# Patient Record
Sex: FEMALE | Race: OTHER | Employment: PART TIME | URBAN - METROPOLITAN AREA
[De-identification: names, ages, dates, MRNs, and addresses within clinical notes are randomized per-mention and may not be internally consistent; named-entity substitution may affect disease eponyms.]

---

## 2019-02-06 ENCOUNTER — TELEPHONE (OUTPATIENT)
Dept: OBGYN CLINIC | Facility: CLINIC | Age: 24
End: 2019-02-06

## 2019-02-06 ENCOUNTER — OFFICE VISIT (OUTPATIENT)
Dept: OBGYN CLINIC | Facility: CLINIC | Age: 24
End: 2019-02-06
Payer: COMMERCIAL

## 2019-02-06 VITALS
HEART RATE: 77 BPM | SYSTOLIC BLOOD PRESSURE: 122 MMHG | DIASTOLIC BLOOD PRESSURE: 84 MMHG | WEIGHT: 184.25 LBS | HEIGHT: 67 IN | BODY MASS INDEX: 28.92 KG/M2

## 2019-02-06 DIAGNOSIS — Z32.00 ENCOUNTER FOR PREGNANCY TEST, RESULT UNKNOWN: ICD-10-CM

## 2019-02-06 DIAGNOSIS — N90.89 LABIAL LESION: Primary | ICD-10-CM

## 2019-02-06 DIAGNOSIS — Z11.3 SCREEN FOR STD (SEXUALLY TRANSMITTED DISEASE): ICD-10-CM

## 2019-02-06 PROBLEM — A60.00 RECURRENT GENITAL HSV (HERPES SIMPLEX VIRUS) INFECTION: Status: ACTIVE | Noted: 2019-02-06

## 2019-02-06 LAB
CONTROL LINE PRESENT WITH A CLEAR BACKGROUND (YES/NO): YES YES/NO
KIT LOT #: NORMAL NUMERIC
PREGNANCY TEST, URINE: NEGATIVE

## 2019-02-06 PROCEDURE — 81025 URINE PREGNANCY TEST: CPT | Performed by: ADVANCED PRACTICE MIDWIFE

## 2019-02-06 PROCEDURE — 99213 OFFICE O/P EST LOW 20 MIN: CPT | Performed by: ADVANCED PRACTICE MIDWIFE

## 2019-02-06 RX ORDER — ACYCLOVIR 800 MG/1
800 TABLET ORAL 2 TIMES DAILY
Qty: 10 TABLET | Refills: 0 | Status: SHIPPED | OUTPATIENT
Start: 2019-02-06 | End: 2019-02-11

## 2019-02-06 NOTE — TELEPHONE ENCOUNTER
PER PT CALLING TO CHECK THE STATUS OF HER SCRIPT / PT WANT TO KNOW WHICH PHARMACY IT WAS SEND TOO / PLS ADV

## 2019-02-06 NOTE — PROGRESS NOTES
HPI:    Patient ID: Bre Rahman is a 26year old female who presents c/o lesions on her vulva bilaterally that started 2 days ago. Reports they are mildly painful especially when urine touches them.  Last sexual activity was 1 month ago and reports using c Rx for acyclovir 800mg BID x 5 days and RTC in 1 week to reassess. If symptoms continue may consider treating with hydrocortisone. Vulvar hygiene reviewed and handout provided.   Condom use encouraged for prevention of STIs, pt encouraged to consider a mo

## 2019-02-07 LAB
C TRACH DNA SPEC QL NAA+PROBE: NEGATIVE
HSV1 DNA SPEC QL NAA+PROBE: POSITIVE
HSV2 DNA SPEC QL NAA+PROBE: NEGATIVE
N GONORRHOEA DNA SPEC QL NAA+PROBE: NEGATIVE

## 2019-02-12 ENCOUNTER — TELEPHONE (OUTPATIENT)
Dept: OBGYN CLINIC | Facility: CLINIC | Age: 24
End: 2019-02-12

## 2019-02-12 NOTE — TELEPHONE ENCOUNTER
----- Message from Harsha Robles CNM sent at 2/12/2019  6:28 AM CST -----  Culture positive for HSV type one.

## 2019-02-13 ENCOUNTER — OFFICE VISIT (OUTPATIENT)
Dept: OBGYN CLINIC | Facility: CLINIC | Age: 24
End: 2019-02-13
Payer: COMMERCIAL

## 2019-02-13 VITALS
BODY MASS INDEX: 29 KG/M2 | SYSTOLIC BLOOD PRESSURE: 135 MMHG | HEART RATE: 67 BPM | DIASTOLIC BLOOD PRESSURE: 87 MMHG | WEIGHT: 187 LBS

## 2019-02-13 DIAGNOSIS — B00.9 HSV-1 (HERPES SIMPLEX VIRUS 1) INFECTION: ICD-10-CM

## 2019-02-13 DIAGNOSIS — Z09 FOLLOW UP: Primary | ICD-10-CM

## 2019-02-13 PROCEDURE — 99213 OFFICE O/P EST LOW 20 MIN: CPT | Performed by: ADVANCED PRACTICE MIDWIFE

## 2019-02-13 RX ORDER — VALACYCLOVIR HYDROCHLORIDE 1 G/1
1 TABLET, FILM COATED ORAL DAILY
Qty: 5 TABLET | Refills: 0 | Status: SHIPPED | OUTPATIENT
Start: 2019-02-13 | End: 2019-02-18

## 2019-02-13 NOTE — PROGRESS NOTES
[facility] Focused Gynecology Problem Exam    Pedro Reyes is a 26year old female presenting for Gyn Exam (1wk f/u )  .     HPI:   Patient presents with:  Gyn Exam: 1wk f/u       Medications (Active prior to today's visit):    Current Outpa Position: Sitting, Cuff Size: adult)   Pulse 67   Wt 187 lb (84.8 kg)   LMP 01/16/2019   Breastfeeding?  No   BMI 29.29 kg/m²     GENERAL: well developed, well nourished, in no apparent distress  ABDOMEN: Soft, non distended; non tender, no masses  GYNE/:

## 2019-02-14 ENCOUNTER — TELEPHONE (OUTPATIENT)
Dept: OBGYN CLINIC | Facility: CLINIC | Age: 24
End: 2019-02-14

## 2019-05-01 ENCOUNTER — TELEPHONE (OUTPATIENT)
Dept: OBGYN CLINIC | Facility: CLINIC | Age: 24
End: 2019-05-01

## 2020-11-18 ENCOUNTER — HOSPITAL ENCOUNTER (OUTPATIENT)
Age: 25
Discharge: HOME | End: 2020-11-18
Attending: EMERGENCY MEDICINE
Payer: COMMERCIAL

## 2020-11-18 VITALS
BODY MASS INDEX: 29.82 KG/M2 | DIASTOLIC BLOOD PRESSURE: 89 MMHG | WEIGHT: 190 LBS | HEIGHT: 67 IN | SYSTOLIC BLOOD PRESSURE: 131 MMHG | TEMPERATURE: 98 F | RESPIRATION RATE: 16 BRPM | HEART RATE: 81 BPM | OXYGEN SATURATION: 100 %

## 2020-11-18 DIAGNOSIS — Z20.2 POSSIBLE EXPOSURE TO STD: Primary | ICD-10-CM

## 2020-11-18 PROCEDURE — 87086 URINE CULTURE/COLONY COUNT: CPT | Performed by: EMERGENCY MEDICINE

## 2020-11-18 PROCEDURE — 87591 N.GONORRHOEAE DNA AMP PROB: CPT | Performed by: EMERGENCY MEDICINE

## 2020-11-18 PROCEDURE — 87491 CHLMYD TRACH DNA AMP PROBE: CPT | Performed by: EMERGENCY MEDICINE

## 2020-11-18 PROCEDURE — 99202 OFFICE O/P NEW SF 15 MIN: CPT | Performed by: EMERGENCY MEDICINE

## 2020-11-18 PROCEDURE — 87205 SMEAR GRAM STAIN: CPT | Performed by: EMERGENCY MEDICINE

## 2020-11-18 PROCEDURE — 81002 URINALYSIS NONAUTO W/O SCOPE: CPT | Performed by: EMERGENCY MEDICINE

## 2020-11-18 PROCEDURE — 87808 TRICHOMONAS ASSAY W/OPTIC: CPT | Performed by: EMERGENCY MEDICINE

## 2020-11-18 PROCEDURE — 81025 URINE PREGNANCY TEST: CPT | Performed by: EMERGENCY MEDICINE

## 2020-11-18 PROCEDURE — 87147 CULTURE TYPE IMMUNOLOGIC: CPT | Performed by: EMERGENCY MEDICINE

## 2020-11-18 PROCEDURE — 87106 FUNGI IDENTIFICATION YEAST: CPT | Performed by: EMERGENCY MEDICINE

## 2020-11-18 PROCEDURE — 96372 THER/PROPH/DIAG INJ SC/IM: CPT | Performed by: EMERGENCY MEDICINE

## 2020-11-18 RX ORDER — AZITHROMYCIN 250 MG/1
1000 TABLET, FILM COATED ORAL ONCE
Status: COMPLETED | OUTPATIENT
Start: 2020-11-18 | End: 2020-11-18

## 2020-11-18 NOTE — ED INITIAL ASSESSMENT (HPI)
C/o painful urination for 11/2 day  Denies fever no back pain   Worried about STD.  Has multiple partner

## 2020-11-18 NOTE — ED PROVIDER NOTES
Patient Seen in: [facility]    History   Patient presents with:  Gynecologic Exam: Potential std or uti - Entered by patient  Urinary Symptoms    Stated Complaint: Gynecologic Exam - Potential std or uti    HPI    Patient complains of possi Notable for the following components:       Result Value    Urine Clarity Slightly cloudy (*)     Protein urine 30  (*)     Ketone, Urine 40  (*)     Bilirubin, Urine Small (*)     Blood, Urine Large (*)     All other components within normal limits   [facility]

## 2020-12-07 ENCOUNTER — HOSPITAL ENCOUNTER (OUTPATIENT)
Age: 25
Discharge: HOME | End: 2020-12-07
Payer: COMMERCIAL

## 2020-12-07 VITALS
DIASTOLIC BLOOD PRESSURE: 83 MMHG | HEIGHT: 67 IN | OXYGEN SATURATION: 98 % | BODY MASS INDEX: 29.82 KG/M2 | HEART RATE: 124 BPM | WEIGHT: 190 LBS | SYSTOLIC BLOOD PRESSURE: 127 MMHG | TEMPERATURE: 97 F | RESPIRATION RATE: 16 BRPM

## 2020-12-07 DIAGNOSIS — N89.8 VAGINAL DISCHARGE: Primary | ICD-10-CM

## 2020-12-07 PROCEDURE — 87086 URINE CULTURE/COLONY COUNT: CPT | Performed by: NURSE PRACTITIONER

## 2020-12-07 PROCEDURE — 87147 CULTURE TYPE IMMUNOLOGIC: CPT | Performed by: NURSE PRACTITIONER

## 2020-12-07 PROCEDURE — 81002 URINALYSIS NONAUTO W/O SCOPE: CPT | Performed by: NURSE PRACTITIONER

## 2020-12-07 PROCEDURE — 87106 FUNGI IDENTIFICATION YEAST: CPT | Performed by: NURSE PRACTITIONER

## 2020-12-07 PROCEDURE — 87808 TRICHOMONAS ASSAY W/OPTIC: CPT | Performed by: NURSE PRACTITIONER

## 2020-12-07 PROCEDURE — 99214 OFFICE O/P EST MOD 30 MIN: CPT | Performed by: NURSE PRACTITIONER

## 2020-12-07 PROCEDURE — 81025 URINE PREGNANCY TEST: CPT | Performed by: NURSE PRACTITIONER

## 2020-12-07 PROCEDURE — 87205 SMEAR GRAM STAIN: CPT | Performed by: NURSE PRACTITIONER

## 2020-12-07 RX ORDER — FLUCONAZOLE 150 MG/1
150 TABLET ORAL ONCE
Qty: 2 TABLET | Refills: 0 | Status: SHIPPED | OUTPATIENT
Start: 2020-12-07 | End: 2020-12-07

## 2020-12-07 NOTE — ED INITIAL ASSESSMENT (HPI)
Treated for STI and BV on 11/18/2020  Patient with vaginal itching 4 days ago, after last antibiotic (Flagyl) dose  Patient self treated with monistat 1 day  +vaginal discharge (yellow)

## 2020-12-07 NOTE — ED PROVIDER NOTES
Patient Seen in: [facility]      History   Patient presents with:  Gynecologic Exam: Entered by patient    Stated Complaint: Gynecologic Exam  Pt self treated for yeast inf/no better    HPI    This is a well-appearing 27-year-old female who p BMI 29.76 kg/m²         Physical Exam  Vitals signs and nursing note reviewed. Constitutional:       General: She is awake. She is not in acute distress. Appearance: Normal appearance. She is not ill-appearing, toxic-appearing or diaphoretic.    HENT: urine Large (*)     All other components within normal limits   [facility] POCT PREGNANCY URINE - Normal   URINE CULTURE, ROUTINE       Urine reviewed, large leukocyte Estrace, trace blood, cloudy colored urine.   I do believe this is secondary to contamination, s

## 2021-02-20 ENCOUNTER — HOSPITAL ENCOUNTER (OUTPATIENT)
Age: 26
Discharge: HOME | End: 2021-02-20
Payer: COMMERCIAL

## 2021-02-20 VITALS
OXYGEN SATURATION: 99 % | SYSTOLIC BLOOD PRESSURE: 128 MMHG | TEMPERATURE: 98 F | WEIGHT: 180 LBS | HEART RATE: 82 BPM | HEIGHT: 68 IN | RESPIRATION RATE: 18 BRPM | DIASTOLIC BLOOD PRESSURE: 71 MMHG | BODY MASS INDEX: 27.28 KG/M2

## 2021-02-20 DIAGNOSIS — R30.0 DYSURIA: Primary | ICD-10-CM

## 2021-02-20 LAB
B-HCG UR QL: NEGATIVE
BILIRUB UR QL STRIP: NEGATIVE
GLUCOSE UR STRIP-MCNC: NEGATIVE MG/DL
HGB UR QL STRIP: NEGATIVE
KETONES UR STRIP-MCNC: NEGATIVE MG/DL
LEUKOCYTE ESTERASE UR QL STRIP: NEGATIVE
NITRITE UR QL STRIP: NEGATIVE
PH UR STRIP: 7 [PH]
PROT UR STRIP-MCNC: NEGATIVE MG/DL
SP GR UR STRIP: 1.01
UROBILINOGEN UR STRIP-ACNC: <2 MG/DL

## 2021-02-20 PROCEDURE — 81025 URINE PREGNANCY TEST: CPT | Performed by: NURSE PRACTITIONER

## 2021-02-20 PROCEDURE — 87086 URINE CULTURE/COLONY COUNT: CPT | Performed by: NURSE PRACTITIONER

## 2021-02-20 PROCEDURE — 99213 OFFICE O/P EST LOW 20 MIN: CPT | Performed by: NURSE PRACTITIONER

## 2021-02-20 PROCEDURE — 81002 URINALYSIS NONAUTO W/O SCOPE: CPT | Performed by: NURSE PRACTITIONER

## 2021-02-20 PROCEDURE — 87077 CULTURE AEROBIC IDENTIFY: CPT | Performed by: NURSE PRACTITIONER

## 2021-02-20 RX ORDER — CEPHALEXIN 500 MG/1
500 CAPSULE ORAL 2 TIMES DAILY
Qty: 10 CAPSULE | Refills: 0 | Status: SHIPPED | OUTPATIENT
Start: 2021-02-20 | End: 2021-02-25

## 2021-02-20 NOTE — ED PROVIDER NOTES
Patient Seen in: [facility]      History   Patient presents with:  Urinary Symptoms: Uti - Entered by patient    Stated Complaint: Urinary Symptoms - Uti    HPI/Subjective:   HPI    This is a well-appearing 21-year-old female with a history o normal.      Mouth/Throat:      Mouth: Mucous membranes are moist.      Pharynx: Oropharynx is clear. Uvula midline. Eyes:      General: Lids are normal.      Extraocular Movements: Extraocular movements intact.       Conjunctiva/sclera: Conjunctivae norm pyelonephritis. Patient verbalized plan of care and states understanding. Disposition and Plan     Clinical Impression:  Dysuria  (primary encounter diagnosis)    Disposition:  Discharge  2/20/2021  1:25 pm    Follow-up:  Renetta Higuera MD  [address] [facility]

## 2021-02-27 ENCOUNTER — HOSPITAL ENCOUNTER (OUTPATIENT)
Age: 26
Discharge: HOME | End: 2021-02-27
Payer: COMMERCIAL

## 2021-02-27 VITALS
DIASTOLIC BLOOD PRESSURE: 74 MMHG | HEART RATE: 99 BPM | OXYGEN SATURATION: 100 % | RESPIRATION RATE: 16 BRPM | TEMPERATURE: 98 F | SYSTOLIC BLOOD PRESSURE: 122 MMHG

## 2021-02-27 DIAGNOSIS — N89.8 VAGINAL DISCHARGE: Primary | ICD-10-CM

## 2021-02-27 LAB
B-HCG UR QL: NEGATIVE
BILIRUB UR QL STRIP: NEGATIVE
COLOR UR: YELLOW
GLUCOSE UR STRIP-MCNC: NEGATIVE MG/DL
HGB UR QL STRIP: NEGATIVE
KETONES UR STRIP-MCNC: NEGATIVE MG/DL
NITRITE UR QL STRIP: NEGATIVE
PH UR STRIP: 7 [PH]
PROT UR STRIP-MCNC: 30 MG/DL
SP GR UR STRIP: 1.02
UROBILINOGEN UR STRIP-ACNC: <2 MG/DL

## 2021-02-27 PROCEDURE — 99214 OFFICE O/P EST MOD 30 MIN: CPT | Performed by: NURSE PRACTITIONER

## 2021-02-27 PROCEDURE — 87808 TRICHOMONAS ASSAY W/OPTIC: CPT | Performed by: NURSE PRACTITIONER

## 2021-02-27 PROCEDURE — 87205 SMEAR GRAM STAIN: CPT | Performed by: NURSE PRACTITIONER

## 2021-02-27 PROCEDURE — 87591 N.GONORRHOEAE DNA AMP PROB: CPT | Performed by: NURSE PRACTITIONER

## 2021-02-27 PROCEDURE — 87491 CHLMYD TRACH DNA AMP PROBE: CPT | Performed by: NURSE PRACTITIONER

## 2021-02-27 PROCEDURE — 87086 URINE CULTURE/COLONY COUNT: CPT | Performed by: NURSE PRACTITIONER

## 2021-02-27 PROCEDURE — 87106 FUNGI IDENTIFICATION YEAST: CPT | Performed by: NURSE PRACTITIONER

## 2021-02-27 PROCEDURE — 81002 URINALYSIS NONAUTO W/O SCOPE: CPT | Performed by: NURSE PRACTITIONER

## 2021-02-27 PROCEDURE — 81025 URINE PREGNANCY TEST: CPT | Performed by: NURSE PRACTITIONER

## 2021-02-27 RX ORDER — METRONIDAZOLE 500 MG/1
500 TABLET ORAL 2 TIMES DAILY
Qty: 14 TABLET | Refills: 0 | Status: SHIPPED | OUTPATIENT
Start: 2021-02-27 | End: 2021-03-06

## 2021-02-27 NOTE — ED PROVIDER NOTES
Patient Seen in: [facility]      History   Patient presents with:  Luis Armando-NATALIO    Stated Complaint: vaginal irritation    HPI/Subjective:   HPI    This well-appearing 62-year-old female who presents with a chief complaint of vaginal discharge, itc normal.      Nose: Nose normal.      Mouth/Throat:      Mouth: Mucous membranes are moist.      Pharynx: Oropharynx is clear. Uvula midline. Eyes:      General: Lids are normal.      Extraocular Movements: Extraocular movements intact.       Conjunctiva/s time she had BV and would like to be treated for BV right now. She is not concerned with any STDs although gonorrhea and chlamydia are pending at this time. She does have a follow-up appointment scheduled with her primary on Monday morning.   She states t

## 2021-03-01 ENCOUNTER — LAB ENCOUNTER (OUTPATIENT)
Dept: LAB | Age: 26
End: 2021-03-01
Attending: FAMILY MEDICINE
Payer: COMMERCIAL

## 2021-03-01 ENCOUNTER — OFFICE VISIT (OUTPATIENT)
Dept: FAMILY MEDICINE CLINIC | Facility: CLINIC | Age: 26
End: 2021-03-01
Payer: COMMERCIAL

## 2021-03-01 VITALS
DIASTOLIC BLOOD PRESSURE: 71 MMHG | WEIGHT: 213 LBS | BODY MASS INDEX: 32.28 KG/M2 | HEIGHT: 68 IN | HEART RATE: 94 BPM | SYSTOLIC BLOOD PRESSURE: 109 MMHG | TEMPERATURE: 97 F

## 2021-03-01 DIAGNOSIS — N76.0 ACUTE VAGINITIS: ICD-10-CM

## 2021-03-01 DIAGNOSIS — Z00.00 ANNUAL PHYSICAL EXAM: ICD-10-CM

## 2021-03-01 DIAGNOSIS — Z00.00 ANNUAL PHYSICAL EXAM: Primary | ICD-10-CM

## 2021-03-01 LAB
ALBUMIN SERPL-MCNC: 3.7 G/DL (ref 3.4–5)
ALBUMIN/GLOB SERPL: 1.1 {RATIO} (ref 1–2)
ALP LIVER SERPL-CCNC: 57 U/L
ALT SERPL-CCNC: 28 U/L
ANION GAP SERPL CALC-SCNC: 1 MMOL/L (ref 0–18)
AST SERPL-CCNC: 12 U/L (ref 15–37)
BASOPHILS # BLD AUTO: 0.06 X10(3) UL (ref 0–0.2)
BASOPHILS NFR BLD AUTO: 0.8 %
BILIRUB SERPL-MCNC: 0.6 MG/DL (ref 0.1–2)
BUN BLD-MCNC: 15 MG/DL (ref 7–18)
BUN/CREAT SERPL: 18.3 (ref 10–20)
C TRACH DNA SPEC QL NAA+PROBE: NEGATIVE
CALCIUM BLD-MCNC: 9 MG/DL (ref 8.5–10.1)
CHLORIDE SERPL-SCNC: 111 MMOL/L (ref 98–112)
CHOLEST SMN-MCNC: 152 MG/DL (ref ?–200)
CO2 SERPL-SCNC: 29 MMOL/L (ref 21–32)
CREAT BLD-MCNC: 0.82 MG/DL
DEPRECATED RDW RBC AUTO: 41.6 FL (ref 35.1–46.3)
EOSINOPHIL # BLD AUTO: 0.22 X10(3) UL (ref 0–0.7)
EOSINOPHIL NFR BLD AUTO: 3.1 %
ERYTHROCYTE [DISTWIDTH] IN BLOOD BY AUTOMATED COUNT: 12.8 % (ref 11–15)
EST. AVERAGE GLUCOSE BLD GHB EST-MCNC: 105 MG/DL (ref 68–126)
GENITAL VAGINOSIS SCREEN: NEGATIVE
GLOBULIN PLAS-MCNC: 3.4 G/DL (ref 2.8–4.4)
GLUCOSE BLD-MCNC: 93 MG/DL (ref 70–99)
HBA1C MFR BLD HPLC: 5.3 % (ref ?–5.7)
HCT VFR BLD AUTO: 40.3 %
HDLC SERPL-MCNC: 48 MG/DL (ref 40–59)
HGB BLD-MCNC: 13.3 G/DL
IMM GRANULOCYTES # BLD AUTO: 0.02 X10(3) UL (ref 0–1)
IMM GRANULOCYTES NFR BLD: 0.3 %
LDLC SERPL CALC-MCNC: 92 MG/DL (ref ?–100)
LYMPHOCYTES # BLD AUTO: 1.32 X10(3) UL (ref 1–4)
LYMPHOCYTES NFR BLD AUTO: 18.5 %
M PROTEIN MFR SERPL ELPH: 7.1 G/DL (ref 6.4–8.2)
MCH RBC QN AUTO: 29.7 PG (ref 26–34)
MCHC RBC AUTO-ENTMCNC: 33 G/DL (ref 31–37)
MCV RBC AUTO: 90 FL
MONOCYTES # BLD AUTO: 0.64 X10(3) UL (ref 0.1–1)
MONOCYTES NFR BLD AUTO: 9 %
N GONORRHOEA DNA SPEC QL NAA+PROBE: NEGATIVE
NEUTROPHILS # BLD AUTO: 4.87 X10 (3) UL (ref 1.5–7.7)
NEUTROPHILS # BLD AUTO: 4.87 X10(3) UL (ref 1.5–7.7)
NEUTROPHILS NFR BLD AUTO: 68.3 %
NONHDLC SERPL-MCNC: 104 MG/DL (ref ?–130)
OSMOLALITY SERPL CALC.SUM OF ELEC: 293 MOSM/KG (ref 275–295)
PATIENT FASTING Y/N/NP: YES
PATIENT FASTING Y/N/NP: YES
PLATELET # BLD AUTO: 329 10(3)UL (ref 150–450)
POTASSIUM SERPL-SCNC: 4.6 MMOL/L (ref 3.5–5.1)
RBC # BLD AUTO: 4.48 X10(6)UL
SODIUM SERPL-SCNC: 141 MMOL/L (ref 136–145)
TRICHOMONAS SCREEN: NEGATIVE
TRIGL SERPL-MCNC: 59 MG/DL (ref 30–149)
TSI SER-ACNC: 1.07 MIU/ML (ref 0.36–3.74)
VLDLC SERPL CALC-MCNC: 12 MG/DL (ref 0–30)
WBC # BLD AUTO: 7.1 X10(3) UL (ref 4–11)

## 2021-03-01 PROCEDURE — 85025 COMPLETE CBC W/AUTO DIFF WBC: CPT

## 2021-03-01 PROCEDURE — 36415 COLL VENOUS BLD VENIPUNCTURE: CPT

## 2021-03-01 PROCEDURE — 83036 HEMOGLOBIN GLYCOSYLATED A1C: CPT

## 2021-03-01 PROCEDURE — 82306 VITAMIN D 25 HYDROXY: CPT

## 2021-03-01 PROCEDURE — 84443 ASSAY THYROID STIM HORMONE: CPT

## 2021-03-01 PROCEDURE — 99385 PREV VISIT NEW AGE 18-39: CPT | Performed by: FAMILY MEDICINE

## 2021-03-01 PROCEDURE — 80053 COMPREHEN METABOLIC PANEL: CPT

## 2021-03-01 PROCEDURE — 3078F DIAST BP <80 MM HG: CPT | Performed by: FAMILY MEDICINE

## 2021-03-01 PROCEDURE — 3008F BODY MASS INDEX DOCD: CPT | Performed by: FAMILY MEDICINE

## 2021-03-01 PROCEDURE — 80061 LIPID PANEL: CPT

## 2021-03-01 PROCEDURE — 3074F SYST BP LT 130 MM HG: CPT | Performed by: FAMILY MEDICINE

## 2021-03-01 NOTE — PROGRESS NOTES
HPI:   Raymundo Conde is a 23year old female who presents for a complete physical exam.    Work: Works as a   Social: Lives with boyfriend  Diet: eats take-out food mainly. Exercise: Sedentary, active at work.    GYN history:   LMP: 23  Perio weight changes  ALL/ASTHMA: denies hx of allergy or asthma    EXAM:   /71 (BP Location: Right arm, Patient Position: Sitting, Cuff Size: large)   Pulse 94   Temp 97.4 °F (36.3 °C) (Tympanic)   Ht 5' 8\" (1.727 m)   Wt 213 lb (96.6 kg)   LMP 01/29/202

## 2021-03-03 LAB — 25(OH)D3 SERPL-MCNC: 16.4 NG/ML (ref 30–100)

## 2021-03-08 ENCOUNTER — TELEPHONE (OUTPATIENT)
Dept: OTHER | Age: 26
End: 2021-03-08

## 2021-03-08 DIAGNOSIS — E55.9 VITAMIN D DEFICIENCY: Primary | ICD-10-CM

## 2021-03-08 DIAGNOSIS — Z23 NEED FOR HPV VACCINATION: ICD-10-CM

## 2021-03-08 RX ORDER — ERGOCALCIFEROL 1.25 MG/1
50000 CAPSULE ORAL WEEKLY
Qty: 12 CAPSULE | Refills: 0 | Status: SHIPPED | OUTPATIENT
Start: 2021-03-08 | End: 2021-05-24

## 2021-03-08 NOTE — TELEPHONE ENCOUNTER
Patient calling to ask for interpretation of recent lab results. Sees vit D level is low. Asking for next steps. Patient states she believes she has not had the HPV vaccine.  I was able to pull over some pediatric records from the [address] [place] [address] heal

## 2021-03-09 NOTE — TELEPHONE ENCOUNTER
Result note sent to patient via [address] Vitamin D sent to pharmacy. It does not appear that she is up-to-date with her HPV vaccine. Order placed and patient informed that she can follow-up with a nurse visit.

## 2021-03-10 ENCOUNTER — OFFICE VISIT (OUTPATIENT)
Dept: OBGYN CLINIC | Facility: CLINIC | Age: 26
End: 2021-03-10
Payer: COMMERCIAL

## 2021-03-10 VITALS
BODY MASS INDEX: 32 KG/M2 | HEART RATE: 91 BPM | WEIGHT: 208.19 LBS | DIASTOLIC BLOOD PRESSURE: 76 MMHG | SYSTOLIC BLOOD PRESSURE: 113 MMHG

## 2021-03-10 DIAGNOSIS — Z01.419 WELL WOMAN EXAM: Primary | ICD-10-CM

## 2021-03-10 DIAGNOSIS — Z12.4 CERVICAL CANCER SCREENING: ICD-10-CM

## 2021-03-10 PROCEDURE — 3078F DIAST BP <80 MM HG: CPT | Performed by: OBSTETRICS & GYNECOLOGY

## 2021-03-10 PROCEDURE — 99395 PREV VISIT EST AGE 18-39: CPT | Performed by: OBSTETRICS & GYNECOLOGY

## 2021-03-10 PROCEDURE — 3074F SYST BP LT 130 MM HG: CPT | Performed by: OBSTETRICS & GYNECOLOGY

## 2021-03-10 NOTE — H&P
HPI:  The patient is a 22 yo F here for WWE. NO gyn c/o currently. Had recurrent dc symptoms recently that were treated at [facility] and by primary MD.  +IC with 1 partner. Not using condoms. Declines std screen today. Cycles monthly with 5 days flow. Active Member of Clubs or Organizations:       Attends Club or Organization Meetings:       Marital Status:   Intimate Partner Violence:       Fear of Current or Ex-Partner:       Emotionally Abused:       Physically Abused:       Sexually Abused:     Syeda Scherer tenderness  Vagina:  Normal appearance without lesions, no abnormal discharge  Cervix:  Normal without tenderness on motion  Uterus: normal in size, contour, position, mobility, without tenderness  Adnexa: normal without masses or tenderness  Perineum: nor

## 2021-04-06 ENCOUNTER — NURSE ONLY (OUTPATIENT)
Dept: FAMILY MEDICINE CLINIC | Facility: CLINIC | Age: 26
End: 2021-04-06
Payer: COMMERCIAL

## 2021-04-06 DIAGNOSIS — Z23 IMMUNIZATION DUE: Primary | ICD-10-CM

## 2021-04-06 PROCEDURE — 90651 9VHPV VACCINE 2/3 DOSE IM: CPT | Performed by: FAMILY MEDICINE

## 2021-04-06 PROCEDURE — 90471 IMMUNIZATION ADMIN: CPT | Performed by: FAMILY MEDICINE

## 2021-04-06 NOTE — PROGRESS NOTES
Pt is here for 1st HPV vaccine.  verified   Pt tolerated well. Advised to come back in 2 months for 2nd vaccine.  Pt verbalized understanding

## 2021-05-24 DIAGNOSIS — E55.9 VITAMIN D DEFICIENCY: ICD-10-CM

## 2021-05-24 RX ORDER — ERGOCALCIFEROL 1.25 MG/1
50000 CAPSULE ORAL WEEKLY
Qty: 12 CAPSULE | Refills: 0 | Status: SHIPPED | OUTPATIENT
Start: 2021-05-24 | End: 2021-08-11

## 2021-05-26 ENCOUNTER — PATIENT MESSAGE (OUTPATIENT)
Dept: FAMILY MEDICINE CLINIC | Facility: CLINIC | Age: 26
End: 2021-05-26

## 2021-05-27 NOTE — TELEPHONE ENCOUNTER
From: Autumn Onopa  To: Adriana Arredondo MD  Sent: 5/26/2021 11:02 PM CDT  Subject: Prescription Question    Hi I was just making sure you sent for my vitamin d refill and that I should continue taking the second bottle?

## 2021-06-10 ENCOUNTER — NURSE ONLY (OUTPATIENT)
Dept: FAMILY MEDICINE CLINIC | Facility: CLINIC | Age: 26
End: 2021-06-10
Payer: COMMERCIAL

## 2021-06-10 DIAGNOSIS — Z23 IMMUNIZATION DUE: Primary | ICD-10-CM

## 2021-06-10 PROCEDURE — 90471 IMMUNIZATION ADMIN: CPT | Performed by: NURSE PRACTITIONER

## 2021-06-10 PROCEDURE — 90651 9VHPV VACCINE 2/3 DOSE IM: CPT | Performed by: NURSE PRACTITIONER

## 2021-06-10 NOTE — PROGRESS NOTES
Patient is here for second hpv vaccine. Patients full name, , and vaccine verified. Patient tolerated vaccine well. Vis sheet given.

## 2021-08-09 DIAGNOSIS — E55.9 VITAMIN D DEFICIENCY: ICD-10-CM

## 2021-08-11 RX ORDER — ERGOCALCIFEROL 1.25 MG/1
50000 CAPSULE ORAL WEEKLY
Qty: 12 CAPSULE | Refills: 0 | Status: SHIPPED | OUTPATIENT
Start: 2021-08-11 | End: 2021-10-27

## 2021-10-27 DIAGNOSIS — E55.9 VITAMIN D DEFICIENCY: ICD-10-CM

## 2021-10-27 RX ORDER — ERGOCALCIFEROL 1.25 MG/1
50000 CAPSULE ORAL WEEKLY
Qty: 12 CAPSULE | Refills: 0 | Status: SHIPPED | OUTPATIENT
Start: 2021-10-27 | End: 2022-01-17

## 2021-10-27 NOTE — TELEPHONE ENCOUNTER
Please review. Protocol failed/ No protocol      Requested Prescriptions   Pending Prescriptions Disp Refills    ERGOCALCIFEROL 1.25 MG (85860 UT) Oral Cap [Pharmacy Med Name: VITAMIN D2 1.25MG(50,000 UNIT)] 12 capsule 0     Sig: TAKE 1 CAPSULE (50,000 UNIT

## 2021-11-30 ENCOUNTER — PATIENT MESSAGE (OUTPATIENT)
Dept: FAMILY MEDICINE CLINIC | Facility: CLINIC | Age: 26
End: 2021-11-30

## 2021-11-30 NOTE — TELEPHONE ENCOUNTER
From: Autumn Onopa  To: Jordan Moreno MD  Sent: 11/30/2021 2:15 AM CST  Subject: Hormone concern     Hi I was wondering how I can get checked for hormonal imbalances? Lately My hair has been thinning, fatigue, etc. I would like to get some tests done.

## 2021-12-10 ENCOUNTER — NURSE ONLY (OUTPATIENT)
Dept: FAMILY MEDICINE CLINIC | Facility: CLINIC | Age: 26
End: 2021-12-10
Payer: COMMERCIAL

## 2021-12-10 DIAGNOSIS — Z23 NEED FOR VACCINATION: Primary | ICD-10-CM

## 2021-12-10 PROCEDURE — 90471 IMMUNIZATION ADMIN: CPT | Performed by: FAMILY MEDICINE

## 2021-12-10 PROCEDURE — 90651 9VHPV VACCINE 2/3 DOSE IM: CPT | Performed by: FAMILY MEDICINE

## 2021-12-10 NOTE — PROGRESS NOTES
Patient is here for her third hpv vaccine. I verified full name, and reason for nurse visit. Patient tolerated injection well.

## 2022-01-16 DIAGNOSIS — E55.9 VITAMIN D DEFICIENCY: ICD-10-CM

## 2022-01-17 NOTE — TELEPHONE ENCOUNTER
Last Vit D done on 3-1-21    Please review refill protocol failed/ no protocol  Requested Prescriptions   Pending Prescriptions Disp Refills    ERGOCALCIFEROL 1.25 MG (75343 UT) Oral Cap [Pharmacy Med Name: VITAMIN D2 1.25MG(50,000 UNIT)] 12 capsule 0

## 2022-01-18 RX ORDER — ERGOCALCIFEROL 1.25 MG/1
50000 CAPSULE ORAL WEEKLY
Qty: 12 CAPSULE | Refills: 1 | Status: SHIPPED | OUTPATIENT
Start: 2022-01-18 | End: 2022-04-06

## 2022-01-21 ENCOUNTER — LAB ENCOUNTER (OUTPATIENT)
Dept: LAB | Age: 27
End: 2022-01-21
Attending: FAMILY MEDICINE
Payer: COMMERCIAL

## 2022-01-21 ENCOUNTER — OFFICE VISIT (OUTPATIENT)
Dept: FAMILY MEDICINE CLINIC | Facility: CLINIC | Age: 27
End: 2022-01-21
Payer: COMMERCIAL

## 2022-01-21 VITALS
TEMPERATURE: 98 F | WEIGHT: 196 LBS | DIASTOLIC BLOOD PRESSURE: 77 MMHG | SYSTOLIC BLOOD PRESSURE: 121 MMHG | BODY MASS INDEX: 29.7 KG/M2 | HEIGHT: 68 IN | HEART RATE: 86 BPM

## 2022-01-21 DIAGNOSIS — E55.9 VITAMIN D DEFICIENCY: ICD-10-CM

## 2022-01-21 DIAGNOSIS — R53.83 FATIGUE, UNSPECIFIED TYPE: ICD-10-CM

## 2022-01-21 DIAGNOSIS — L65.9 HAIR LOSS: Primary | ICD-10-CM

## 2022-01-21 DIAGNOSIS — R68.82 LOW LIBIDO: ICD-10-CM

## 2022-01-21 DIAGNOSIS — E61.1 IRON DEFICIENCY: ICD-10-CM

## 2022-01-21 DIAGNOSIS — L65.9 HAIR LOSS: ICD-10-CM

## 2022-01-21 LAB
IRON SATN MFR SERPL: 19 %
IRON SERPL-MCNC: 73 UG/DL
TIBC SERPL-MCNC: 389 UG/DL (ref 240–450)
TRANSFERRIN SERPL-MCNC: 261 MG/DL (ref 200–360)
TSI SER-ACNC: 3.36 MIU/ML (ref 0.36–3.74)
VIT D+METAB SERPL-MCNC: 41.1 NG/ML (ref 30–100)

## 2022-01-21 PROCEDURE — 3074F SYST BP LT 130 MM HG: CPT | Performed by: FAMILY MEDICINE

## 2022-01-21 PROCEDURE — 84466 ASSAY OF TRANSFERRIN: CPT

## 2022-01-21 PROCEDURE — 84402 ASSAY OF FREE TESTOSTERONE: CPT

## 2022-01-21 PROCEDURE — 3078F DIAST BP <80 MM HG: CPT | Performed by: FAMILY MEDICINE

## 2022-01-21 PROCEDURE — 84443 ASSAY THYROID STIM HORMONE: CPT

## 2022-01-21 PROCEDURE — 3008F BODY MASS INDEX DOCD: CPT | Performed by: FAMILY MEDICINE

## 2022-01-21 PROCEDURE — 99213 OFFICE O/P EST LOW 20 MIN: CPT | Performed by: FAMILY MEDICINE

## 2022-01-21 PROCEDURE — 36415 COLL VENOUS BLD VENIPUNCTURE: CPT

## 2022-01-21 PROCEDURE — 84403 ASSAY OF TOTAL TESTOSTERONE: CPT

## 2022-01-21 PROCEDURE — 83540 ASSAY OF IRON: CPT

## 2022-01-21 PROCEDURE — 82306 VITAMIN D 25 HYDROXY: CPT

## 2022-01-21 NOTE — PROGRESS NOTES
Patient presents with:  Hair/Scalp Problem: concerned w/ thining     HPI:   Lily Anne is a 27year old female who presents to clinic with complaints of hair loss and hair thinning diffusely throughout scalp noted for the past year.   Patient has been ty

## 2022-01-24 LAB
TESTOSTERONE, FREE, S: 0.53 NG/DL
TESTOSTERONE, TOTAL, S: 22 NG/DL

## 2022-05-16 ENCOUNTER — HOSPITAL ENCOUNTER (OUTPATIENT)
Age: 27
Discharge: HOME | End: 2022-05-16
Payer: COMMERCIAL

## 2022-05-16 VITALS
RESPIRATION RATE: 16 BRPM | HEIGHT: 67 IN | SYSTOLIC BLOOD PRESSURE: 116 MMHG | HEART RATE: 59 BPM | BODY MASS INDEX: 28.25 KG/M2 | DIASTOLIC BLOOD PRESSURE: 72 MMHG | OXYGEN SATURATION: 100 % | WEIGHT: 180 LBS | TEMPERATURE: 98 F

## 2022-05-16 DIAGNOSIS — B37.9 YEAST INFECTION: Primary | ICD-10-CM

## 2022-05-16 PROCEDURE — 87808 TRICHOMONAS ASSAY W/OPTIC: CPT | Performed by: PHYSICIAN ASSISTANT

## 2022-05-16 PROCEDURE — 99213 OFFICE O/P EST LOW 20 MIN: CPT | Performed by: PHYSICIAN ASSISTANT

## 2022-05-16 PROCEDURE — 87106 FUNGI IDENTIFICATION YEAST: CPT | Performed by: PHYSICIAN ASSISTANT

## 2022-05-16 PROCEDURE — 87205 SMEAR GRAM STAIN: CPT | Performed by: PHYSICIAN ASSISTANT

## 2022-05-16 RX ORDER — VALACYCLOVIR HYDROCHLORIDE 500 MG/1
500 TABLET, FILM COATED ORAL 2 TIMES DAILY
Qty: 6 TABLET | Refills: 1 | Status: SHIPPED | OUTPATIENT
Start: 2022-05-16 | End: 2022-05-19

## 2022-05-16 RX ORDER — FLUCONAZOLE 150 MG/1
150 TABLET ORAL ONCE
Qty: 1 TABLET | Refills: 0 | Status: SHIPPED | OUTPATIENT
Start: 2022-05-16 | End: 2022-05-16

## 2022-05-16 NOTE — ED INITIAL ASSESSMENT (HPI)
Patient reports having vaginal itching with a rash for 3 days. Patient with a hx of HSV-1. Patient denies vaginal discharge, foul odor, dysuria, hematuria, or back pain.

## 2022-05-18 LAB
GENITAL VAGINOSIS SCREEN: NEGATIVE
TRICHOMONAS SCREEN: NEGATIVE

## 2023-03-20 ENCOUNTER — OFFICE VISIT (OUTPATIENT)
Dept: FAMILY MEDICINE CLINIC | Facility: CLINIC | Age: 28
End: 2023-03-20

## 2023-03-20 ENCOUNTER — LAB ENCOUNTER (OUTPATIENT)
Dept: LAB | Age: 28
End: 2023-03-20
Attending: NURSE PRACTITIONER
Payer: COMMERCIAL

## 2023-03-20 VITALS
DIASTOLIC BLOOD PRESSURE: 82 MMHG | WEIGHT: 206.19 LBS | HEIGHT: 67 IN | BODY MASS INDEX: 32.36 KG/M2 | HEART RATE: 74 BPM | SYSTOLIC BLOOD PRESSURE: 122 MMHG

## 2023-03-20 DIAGNOSIS — E55.9 VITAMIN D DEFICIENCY: ICD-10-CM

## 2023-03-20 DIAGNOSIS — Z00.00 WELL ADULT EXAM: ICD-10-CM

## 2023-03-20 DIAGNOSIS — A60.00 RECURRENT GENITAL HSV (HERPES SIMPLEX VIRUS) INFECTION: ICD-10-CM

## 2023-03-20 DIAGNOSIS — Z00.00 WELL ADULT EXAM: Primary | ICD-10-CM

## 2023-03-20 DIAGNOSIS — Z23 IMMUNIZATION DUE: ICD-10-CM

## 2023-03-20 LAB
ALBUMIN SERPL-MCNC: 3.6 G/DL (ref 3.4–5)
ALBUMIN/GLOB SERPL: 1 {RATIO} (ref 1–2)
ALP LIVER SERPL-CCNC: 52 U/L
ALT SERPL-CCNC: 24 U/L
ANION GAP SERPL CALC-SCNC: 7 MMOL/L (ref 0–18)
AST SERPL-CCNC: 12 U/L (ref 15–37)
BILIRUB SERPL-MCNC: 0.6 MG/DL (ref 0.1–2)
BUN BLD-MCNC: 18 MG/DL (ref 7–18)
BUN/CREAT SERPL: 24.3 (ref 10–20)
CALCIUM BLD-MCNC: 8.7 MG/DL (ref 8.5–10.1)
CHLORIDE SERPL-SCNC: 110 MMOL/L (ref 98–112)
CO2 SERPL-SCNC: 26 MMOL/L (ref 21–32)
CREAT BLD-MCNC: 0.74 MG/DL
FASTING STATUS PATIENT QL REPORTED: YES
GFR SERPLBLD BASED ON 1.73 SQ M-ARVRAT: 115 ML/MIN/1.73M2 (ref 60–?)
GLOBULIN PLAS-MCNC: 3.6 G/DL (ref 2.8–4.4)
GLUCOSE BLD-MCNC: 94 MG/DL (ref 70–99)
OSMOLALITY SERPL CALC.SUM OF ELEC: 298 MOSM/KG (ref 275–295)
POTASSIUM SERPL-SCNC: 4.3 MMOL/L (ref 3.5–5.1)
PROT SERPL-MCNC: 7.2 G/DL (ref 6.4–8.2)
SODIUM SERPL-SCNC: 143 MMOL/L (ref 136–145)
TSI SER-ACNC: 1.29 MIU/ML (ref 0.36–3.74)
VIT D+METAB SERPL-MCNC: 29 NG/ML (ref 30–100)

## 2023-03-20 PROCEDURE — 99395 PREV VISIT EST AGE 18-39: CPT | Performed by: NURSE PRACTITIONER

## 2023-03-20 PROCEDURE — 36415 COLL VENOUS BLD VENIPUNCTURE: CPT

## 2023-03-20 PROCEDURE — 3074F SYST BP LT 130 MM HG: CPT | Performed by: NURSE PRACTITIONER

## 2023-03-20 PROCEDURE — 82306 VITAMIN D 25 HYDROXY: CPT | Performed by: NURSE PRACTITIONER

## 2023-03-20 PROCEDURE — 80053 COMPREHEN METABOLIC PANEL: CPT

## 2023-03-20 PROCEDURE — 90471 IMMUNIZATION ADMIN: CPT | Performed by: NURSE PRACTITIONER

## 2023-03-20 PROCEDURE — 84443 ASSAY THYROID STIM HORMONE: CPT

## 2023-03-20 PROCEDURE — 3008F BODY MASS INDEX DOCD: CPT | Performed by: NURSE PRACTITIONER

## 2023-03-20 PROCEDURE — 3079F DIAST BP 80-89 MM HG: CPT | Performed by: NURSE PRACTITIONER

## 2023-03-20 PROCEDURE — 90715 TDAP VACCINE 7 YRS/> IM: CPT | Performed by: NURSE PRACTITIONER

## 2023-03-20 RX ORDER — VALACYCLOVIR HYDROCHLORIDE 500 MG/1
TABLET, FILM COATED ORAL
COMMUNITY
Start: 2022-12-07 | End: 2023-03-20 | Stop reason: SDUPTHER

## 2023-03-20 RX ORDER — VALACYCLOVIR HYDROCHLORIDE 500 MG/1
500 TABLET, FILM COATED ORAL 2 TIMES DAILY
Qty: 14 TABLET | Refills: 3 | Status: SHIPPED | OUTPATIENT
Start: 2023-03-20 | End: 2023-03-27

## 2023-03-20 NOTE — ASSESSMENT & PLAN NOTE
Please aim to eat a diet high in fresh fruits and vegetables, lean protein sources, complex carbohydrates and limited processed and fast foods. Try to get at least 150 minutes of exercise per week-a combination of weight resistance and cardio is preferred.     Declines flu and covid  Agrees to tdap vaccine  Up to date pap  Encourage safe sex practices

## 2023-04-03 ENCOUNTER — PATIENT MESSAGE (OUTPATIENT)
Dept: FAMILY MEDICINE CLINIC | Facility: CLINIC | Age: 28
End: 2023-04-03

## 2023-04-03 DIAGNOSIS — A60.00 RECURRENT GENITAL HSV (HERPES SIMPLEX VIRUS) INFECTION: ICD-10-CM

## 2023-04-04 NOTE — TELEPHONE ENCOUNTER
From: Autumn Onopa  To: MARCK Naranjo  Sent: 4/3/2023 3:34 PM CDT  Subject: Prescription     My pharmacy never received my prescription. Im not sure if I have to request a refill on mychart or how it works. ?

## 2023-04-17 ENCOUNTER — OFFICE VISIT (OUTPATIENT)
Dept: FAMILY MEDICINE CLINIC | Facility: CLINIC | Age: 28
End: 2023-04-17

## 2023-04-17 VITALS
DIASTOLIC BLOOD PRESSURE: 82 MMHG | HEART RATE: 99 BPM | WEIGHT: 210 LBS | BODY MASS INDEX: 32.96 KG/M2 | SYSTOLIC BLOOD PRESSURE: 123 MMHG | HEIGHT: 67 IN

## 2023-04-17 DIAGNOSIS — N76.0 ACUTE VAGINITIS: Primary | ICD-10-CM

## 2023-04-17 LAB — TRICHOMONAS SCREEN: NEGATIVE

## 2023-04-17 RX ORDER — FLUCONAZOLE 100 MG/1
100 TABLET ORAL DAILY
Qty: 10 TABLET | Refills: 1 | Status: SHIPPED | OUTPATIENT
Start: 2023-04-17 | End: 2023-04-27

## 2023-04-19 LAB
GENITAL VAGINOSIS SCREEN: NEGATIVE
TRICHOMONAS SCREEN: NEGATIVE

## 2023-12-01 ENCOUNTER — OFFICE VISIT (OUTPATIENT)
Dept: FAMILY MEDICINE CLINIC | Facility: CLINIC | Age: 28
End: 2023-12-01

## 2023-12-01 VITALS
DIASTOLIC BLOOD PRESSURE: 88 MMHG | BODY MASS INDEX: 35.31 KG/M2 | SYSTOLIC BLOOD PRESSURE: 118 MMHG | WEIGHT: 225 LBS | HEIGHT: 67 IN | HEART RATE: 71 BPM

## 2023-12-01 DIAGNOSIS — J30.9 ALLERGIC RHINITIS, UNSPECIFIED SEASONALITY, UNSPECIFIED TRIGGER: Primary | ICD-10-CM

## 2023-12-01 DIAGNOSIS — H10.13 ALLERGIC CONJUNCTIVITIS OF BOTH EYES AND RHINITIS: ICD-10-CM

## 2023-12-01 DIAGNOSIS — J30.9 ALLERGIC CONJUNCTIVITIS OF BOTH EYES AND RHINITIS: ICD-10-CM

## 2023-12-01 PROCEDURE — 3008F BODY MASS INDEX DOCD: CPT

## 2023-12-01 PROCEDURE — 99213 OFFICE O/P EST LOW 20 MIN: CPT

## 2023-12-01 PROCEDURE — 3074F SYST BP LT 130 MM HG: CPT

## 2023-12-01 PROCEDURE — 3079F DIAST BP 80-89 MM HG: CPT

## 2023-12-01 RX ORDER — LEVOCETIRIZINE DIHYDROCHLORIDE 5 MG/1
5 TABLET, FILM COATED ORAL EVERY EVENING
Qty: 30 TABLET | Refills: 0 | Status: SHIPPED | OUTPATIENT
Start: 2023-12-01

## 2023-12-01 RX ORDER — FLUTICASONE PROPIONATE 50 MCG
2 SPRAY, SUSPENSION (ML) NASAL DAILY
Qty: 1 EACH | Refills: 0 | Status: SHIPPED | OUTPATIENT
Start: 2023-12-01 | End: 2024-11-25

## 2023-12-01 RX ORDER — OLOPATADINE HYDROCHLORIDE 2 MG/ML
1 SOLUTION/ DROPS OPHTHALMIC DAILY
Qty: 2.5 ML | Refills: 3 | Status: SHIPPED | OUTPATIENT
Start: 2023-12-01

## 2023-12-01 NOTE — PATIENT INSTRUCTIONS
Rhinitis is often caused by environmental allergies, try to find out what triggers your symptoms. Take steps to avoid your triggers. Avoid yard work. It can stir up both pollen and mold. Do not smoke or allow others to smoke around you. If you need help quitting, let me know, there are options we can discuss to help in your cessation. Do not use aerosol sprays, cleaning products, or perfumes. If pollen is one of your triggers, close your house and car windows during blooming season. Clean your house often to control dust.  Over-the-counter medicines can help relieve symptoms, Flonase intranasal spray, and a daily allergy pill (claritin, zyrtec) follow the directions listed. Use saline (saltwater) nasal washes to help keep your nasal passages open and wash out mucus and allergens. You can buy saline nose sprays at a grocery store or drugstore. If your symptoms persist we can always try adding prescription medications and or an allergy referral.    Patient aware of plan of care. All questions answered to satisfaction of the patient. Patient instructed to call office or reach out via [employer] if any issues arise. For urgent issues and/or reviewed red flags please proceed to the urgent care or ER.     MARCK Siegel

## 2024-06-18 RX ORDER — VALACYCLOVIR HYDROCHLORIDE 500 MG/1
500 TABLET, FILM COATED ORAL 2 TIMES DAILY
Qty: 14 TABLET | Refills: 2 | Status: SHIPPED | OUTPATIENT
Start: 2024-06-18

## 2024-06-19 NOTE — TELEPHONE ENCOUNTER
Refill passed per [facility] protocol.     Requested Prescriptions   Pending Prescriptions Disp Refills    VALACYCLOVIR 500 MG Oral Tab [Pharmacy Med Name: VALACYCLOVIR  MG TABLET] 14 tablet 2     Sig: TAKE 1 TABLET BY MOUTH TWICE A DAY FOR 7 DAYS       Herpes Agent Protocol Passed - 6/16/2024  6:17 PM        Passed - In person appointment or virtual visit in the past 12 mos or appointment in next 3 mos     Recent Outpatient Visits              6 months ago Allergic rhinitis, unspecified seasonality, unspecified trigger    [facility], Prieto Rivas APRN    Office Visit    1 year ago Acute vaginitis    [facility], Nasir Nash MD    Office Visit    1 year ago Well adult exam    [facility], Cara Raygoza APRN    Office Visit    2 years ago Hair loss    [facility], Deisy Ibanez MD    Office Visit    2 years [facility]

## 2025-03-11 ENCOUNTER — OFFICE VISIT (OUTPATIENT)
Dept: INTERNAL MEDICINE CLINIC | Facility: CLINIC | Age: 30
End: 2025-03-11
Payer: COMMERCIAL

## 2025-03-11 VITALS
TEMPERATURE: 99 F | WEIGHT: 200 LBS | OXYGEN SATURATION: 98 % | BODY MASS INDEX: 31.39 KG/M2 | DIASTOLIC BLOOD PRESSURE: 74 MMHG | HEART RATE: 84 BPM | RESPIRATION RATE: 18 BRPM | SYSTOLIC BLOOD PRESSURE: 118 MMHG | HEIGHT: 67 IN

## 2025-03-11 DIAGNOSIS — Z13.29 SCREENING FOR THYROID DISORDER: ICD-10-CM

## 2025-03-11 DIAGNOSIS — Z13.0 SCREENING FOR BLOOD DISEASE: ICD-10-CM

## 2025-03-11 DIAGNOSIS — E55.9 VITAMIN D DEFICIENCY: ICD-10-CM

## 2025-03-11 DIAGNOSIS — Z13.1 SCREENING FOR DIABETES MELLITUS: ICD-10-CM

## 2025-03-11 DIAGNOSIS — J01.10 ACUTE NON-RECURRENT FRONTAL SINUSITIS: Primary | ICD-10-CM

## 2025-03-11 DIAGNOSIS — Z13.220 SCREENING, LIPID: ICD-10-CM

## 2025-03-11 PROBLEM — Z23 IMMUNIZATION DUE: Status: RESOLVED | Noted: 2023-03-20 | Resolved: 2025-03-11

## 2025-03-11 PROBLEM — Z00.00 WELL ADULT EXAM: Status: RESOLVED | Noted: 2023-03-20 | Resolved: 2025-03-11

## 2025-03-11 PROCEDURE — 99203 OFFICE O/P NEW LOW 30 MIN: CPT | Performed by: NURSE PRACTITIONER

## 2025-03-11 NOTE — PROGRESS NOTES
Alma Sean is a 29 year old female who presents as a new patient to establish:   due for CPE and pap   will order labs and she will return     Patient complains of:   sinus congestion.  Started Saturday.   Negative otc covid.  Increased headaches and pressure worse with lying down and bending forward.  Tried Tylenol and mucinex without much help.  No fever or chills.  Feeling ill.  No cough       Hx Genital herpes   not frequent outbreaks.  Has valterx at home for flare.      Wt Readings from Last 6 Encounters:   25 200 lb (90.7 kg)   23 225 lb (102.1 kg)   23 210 lb (95.3 kg)   23 206 lb 3.2 oz (93.5 kg)   22 180 lb (81.6 kg)   22 196 lb (88.9 kg)       Cholesterol, Total (mg/dL)   Date Value   2021 152     HDL Cholesterol (mg/dL)   Date Value   2021 48     LDL Cholesterol (mg/dL)   Date Value   2021 92     AST (U/L)   Date Value   2023 12 (L)   2021 12 (L)     ALT (U/L)   Date Value   2023 24   2021 28        Current Outpatient Medications   Medication Sig Dispense Refill    amoxicillin clavulanate 875-125 MG Oral Tab Take 1 tablet by mouth 2 (two) times daily for 10 days. 20 tablet 0    VALACYCLOVIR 500 MG Oral Tab TAKE 1 TABLET BY MOUTH TWICE A DAY FOR 7 DAYS 14 tablet 2    levocetirizine 5 MG Oral Tab Take 1 tablet (5 mg total) by mouth every evening. 30 tablet 0    Olopatadine HCl 0.2 % Ophthalmic Solution Apply 1 drop to eye daily. 2.5 mL 3      Past Medical History:    Genital herpes      Past Surgical History:   Procedure Laterality Date    Other surgical history  2020          Family History   Problem Relation Age of Onset    Cancer Mother       Social History     Socioeconomic History    Marital status: Single   Tobacco Use    Smoking status: Never     Passive exposure: Never    Smokeless tobacco: Never   Vaping Use    Vaping status: Never Used   Substance and Sexual Activity    Alcohol use: Yes     Comment: social     Drug use: No     Social Drivers of Health      [facility]    Housing Stability     Social History: Occ:  . : no. Children: no.  Exercise: minimal.  Diet:   Smoker: No    Cessation advised:    Alcohol Use:  yes      Concerns:  no     Health Maintenance  Immunizations:   Immunization History   Administered Date(s) Administered    DTAP 06/07/1997, 08/09/1997, 12/01/1997, 09/28/1998    HEP B, Ped/Adol 05/10/1997, 07/11/1997, 02/14/1998    Hib, Unspecified Formulation 06/07/1997, 08/09/1997, 12/01/1997, 07/18/1998    Hpv Virus Vaccine 9 Marii Im 04/06/2021, 06/10/2021, 12/10/2021    IPV 06/07/1997, 08/09/1997, 09/28/1998    MMR 07/18/1998    TDAP 03/20/2023    Varicella 04/29/1998     Dental Visits: yes   Colonoscopy: na   Pap: she is due.  She will return for CPE.        History of dysplasia:  No  Menstrual Cycle: few weeks ago     regular.     Sexually Active:  Yes  Defers birth control.   MVI daily.        REVIEW OF SYSTEMS:   GENERAL: feels ill  HEENT: as above   LUNGS: denies shortness of breath with exertion  CARDIOVASCULAR: denies chest pain on exertion  GI: denies abdominal pain,denies heartburn  NEURO:sinus headaches  PSYCH: no c/o      EXAM:   /74   Pulse 84   Temp 98.8 °F (37.1 °C)   Resp 18   Ht 5' 7\" (1.702 m)   Wt 200 lb (90.7 kg)   SpO2 98%   BMI 31.32 kg/m²   Body mass index is 31.32 kg/m².   GENERAL: well developed, well nourished,in no apparent distress  HEENT: atraumatic, normocephalic,ears and throat are clear  PND  frontal sinus tenderness.    NECK: supple,bilateral submandibular lymphyadenopathy  reactive.   LUNGS: clear to auscultation  CARDIO: RRR without murmur  NEURO: Oriented times three    ASSESSMENT AND PLAN:      Will have labs completed and return for CPE.      Encounter Diagnoses   Name Primary?    Acute non-recurrent frontal sinusitis  augmentin.  Add claritan and flonase.  Call with unresolved  symptoms.     Yes    Screening for blood disease     Screening for diabetes mellitus     Screening, lipid     Screening for thyroid disorder     Vitamin D deficiency  check vit d  hx deficiency.         Orders Placed This Encounter   Procedures    CBC With Differential With Platelet    Comp Metabolic Panel    Lipid Panel    TSH W Reflex To Free T4    Vitamin D, 25-Hydroxy       Meds & Refills for this Visit:  Requested Prescriptions     Signed Prescriptions Disp Refills    amoxicillin clavulanate 875-125 MG Oral Tab 20 tablet 0     Sig: Take 1 tablet by mouth 2 (two) times daily for 10 days.       Imaging & Consults:  None    No follow-ups on file.  There are no Patient Instructions on file for this visit.

## 2025-03-28 ENCOUNTER — LAB ENCOUNTER (OUTPATIENT)
Dept: LAB | Age: 30
End: 2025-03-28
Attending: NURSE PRACTITIONER
Payer: COMMERCIAL

## 2025-03-28 DIAGNOSIS — Z13.1 SCREENING FOR DIABETES MELLITUS: ICD-10-CM

## 2025-03-28 DIAGNOSIS — Z13.29 SCREENING FOR THYROID DISORDER: ICD-10-CM

## 2025-03-28 DIAGNOSIS — E55.9 VITAMIN D DEFICIENCY: ICD-10-CM

## 2025-03-28 DIAGNOSIS — Z13.220 SCREENING, LIPID: ICD-10-CM

## 2025-03-28 DIAGNOSIS — Z13.0 SCREENING FOR BLOOD DISEASE: ICD-10-CM

## 2025-03-28 LAB
ALBUMIN SERPL-MCNC: 4.5 G/DL (ref 3.2–4.8)
ALBUMIN/GLOB SERPL: 1.7 {RATIO} (ref 1–2)
ALP LIVER SERPL-CCNC: 55 U/L
ALT SERPL-CCNC: 13 U/L
ANION GAP SERPL CALC-SCNC: 10 MMOL/L (ref 0–18)
AST SERPL-CCNC: 16 U/L (ref ?–34)
BASOPHILS # BLD AUTO: 0.05 X10(3) UL (ref 0–0.2)
BASOPHILS NFR BLD AUTO: 0.7 %
BILIRUB SERPL-MCNC: 1.1 MG/DL (ref 0.3–1.2)
BUN BLD-MCNC: 14 MG/DL (ref 9–23)
CALCIUM BLD-MCNC: 9.4 MG/DL (ref 8.7–10.6)
CHLORIDE SERPL-SCNC: 106 MMOL/L (ref 98–112)
CHOLEST SERPL-MCNC: 163 MG/DL (ref ?–200)
CO2 SERPL-SCNC: 25 MMOL/L (ref 21–32)
CREAT BLD-MCNC: 0.77 MG/DL
EGFRCR SERPLBLD CKD-EPI 2021: 108 ML/MIN/1.73M2 (ref 60–?)
EOSINOPHIL # BLD AUTO: 0.12 X10(3) UL (ref 0–0.7)
EOSINOPHIL NFR BLD AUTO: 1.7 %
ERYTHROCYTE [DISTWIDTH] IN BLOOD BY AUTOMATED COUNT: 12.8 %
FASTING PATIENT LIPID ANSWER: YES
FASTING STATUS PATIENT QL REPORTED: YES
GLOBULIN PLAS-MCNC: 2.6 G/DL (ref 2–3.5)
GLUCOSE BLD-MCNC: 85 MG/DL (ref 70–99)
HCT VFR BLD AUTO: 40.7 %
HDLC SERPL-MCNC: 48 MG/DL (ref 40–59)
HGB BLD-MCNC: 13.5 G/DL
IMM GRANULOCYTES # BLD AUTO: 0.01 X10(3) UL (ref 0–1)
IMM GRANULOCYTES NFR BLD: 0.1 %
LDLC SERPL CALC-MCNC: 99 MG/DL (ref ?–100)
LYMPHOCYTES # BLD AUTO: 2.46 X10(3) UL (ref 1–4)
LYMPHOCYTES NFR BLD AUTO: 34.1 %
MCH RBC QN AUTO: 29.2 PG (ref 26–34)
MCHC RBC AUTO-ENTMCNC: 33.2 G/DL (ref 31–37)
MCV RBC AUTO: 88.1 FL
MONOCYTES # BLD AUTO: 0.79 X10(3) UL (ref 0.1–1)
MONOCYTES NFR BLD AUTO: 11 %
NEUTROPHILS # BLD AUTO: 3.78 X10 (3) UL (ref 1.5–7.7)
NEUTROPHILS # BLD AUTO: 3.78 X10(3) UL (ref 1.5–7.7)
NEUTROPHILS NFR BLD AUTO: 52.4 %
NONHDLC SERPL-MCNC: 115 MG/DL (ref ?–130)
OSMOLALITY SERPL CALC.SUM OF ELEC: 292 MOSM/KG (ref 275–295)
PLATELET # BLD AUTO: 331 10(3)UL (ref 150–450)
POTASSIUM SERPL-SCNC: 3.9 MMOL/L (ref 3.5–5.1)
PROT SERPL-MCNC: 7.1 G/DL (ref 5.7–8.2)
RBC # BLD AUTO: 4.62 X10(6)UL
SODIUM SERPL-SCNC: 141 MMOL/L (ref 136–145)
TRIGL SERPL-MCNC: 84 MG/DL (ref 30–149)
TSI SER-ACNC: 3.76 UIU/ML (ref 0.55–4.78)
VIT D+METAB SERPL-MCNC: 29 NG/ML (ref 30–100)
VLDLC SERPL CALC-MCNC: 14 MG/DL (ref 0–30)
WBC # BLD AUTO: 7.2 X10(3) UL (ref 4–11)

## 2025-03-28 PROCEDURE — 82306 VITAMIN D 25 HYDROXY: CPT | Performed by: NURSE PRACTITIONER

## 2025-03-28 PROCEDURE — 80061 LIPID PANEL: CPT | Performed by: NURSE PRACTITIONER

## 2025-03-28 PROCEDURE — 80050 GENERAL HEALTH PANEL: CPT | Performed by: NURSE PRACTITIONER

## 2025-05-06 ENCOUNTER — OFFICE VISIT (OUTPATIENT)
Dept: INTERNAL MEDICINE CLINIC | Facility: CLINIC | Age: 30
End: 2025-05-06
Payer: COMMERCIAL

## 2025-05-06 VITALS
SYSTOLIC BLOOD PRESSURE: 122 MMHG | DIASTOLIC BLOOD PRESSURE: 76 MMHG | HEIGHT: 68 IN | OXYGEN SATURATION: 99 % | RESPIRATION RATE: 14 BRPM | HEART RATE: 74 BPM | WEIGHT: 205 LBS | TEMPERATURE: 97 F | BODY MASS INDEX: 31.07 KG/M2

## 2025-05-06 DIAGNOSIS — E55.9 VITAMIN D DEFICIENCY: ICD-10-CM

## 2025-05-06 DIAGNOSIS — Z00.00 ROUTINE GENERAL MEDICAL EXAMINATION AT A HEALTH CARE FACILITY: Primary | ICD-10-CM

## 2025-05-06 DIAGNOSIS — Z11.3 SCREEN FOR STD (SEXUALLY TRANSMITTED DISEASE): ICD-10-CM

## 2025-05-06 DIAGNOSIS — Z12.4 SCREENING FOR CERVICAL CANCER: ICD-10-CM

## 2025-05-06 DIAGNOSIS — A60.00 RECURRENT GENITAL HSV (HERPES SIMPLEX VIRUS) INFECTION: ICD-10-CM

## 2025-05-06 DIAGNOSIS — K64.9 HEMORRHOIDS, UNSPECIFIED HEMORRHOID TYPE: ICD-10-CM

## 2025-05-06 PROBLEM — J30.9 ALLERGIC CONJUNCTIVITIS OF BOTH EYES AND RHINITIS: Status: RESOLVED | Noted: 2023-12-01 | Resolved: 2025-05-06

## 2025-05-06 PROBLEM — H10.13 ALLERGIC CONJUNCTIVITIS OF BOTH EYES AND RHINITIS: Status: RESOLVED | Noted: 2023-12-01 | Resolved: 2025-05-06

## 2025-05-06 PROCEDURE — 88175 CYTOPATH C/V AUTO FLUID REDO: CPT | Performed by: NURSE PRACTITIONER

## 2025-05-06 PROCEDURE — 99395 PREV VISIT EST AGE 18-39: CPT | Performed by: NURSE PRACTITIONER

## 2025-05-06 PROCEDURE — 87591 N.GONORRHOEAE DNA AMP PROB: CPT | Performed by: NURSE PRACTITIONER

## 2025-05-06 PROCEDURE — 87491 CHLMYD TRACH DNA AMP PROBE: CPT | Performed by: NURSE PRACTITIONER

## 2025-05-06 RX ORDER — VALACYCLOVIR HYDROCHLORIDE 500 MG/1
500 TABLET, FILM COATED ORAL 2 TIMES DAILY
Qty: 14 TABLET | Refills: 2 | Status: SHIPPED | OUTPATIENT
Start: 2025-05-06

## 2025-05-06 NOTE — PROGRESS NOTES
The following individual(s) verbally consented to be recorded using ambient AI listening technology and understand that they can each withdraw their consent to this listening technology at any point by asking the clinician to turn off or pause the recording:    Patient name: Alma Estrada  Additional names:            Alma Estrada is a 30 year old female who presents for a complete physical exam:       Patient complains of:     History of Present Illness  Ms. Alma Estrada is a 30 year old female who presents for CPE and with concerns about two external lesions on the labia majora     She noticed two external lesions on the labia majora approximately one month ago. Initially, she thought they resembled previous molluscum contagiosum lesions but is now concerned about HPV. She has no prior history of HPV and received the HPV vaccine.    She takes valacyclovir episodically for genital herpes outbreaks, using it twice daily for seven days during each outbreak. No current outbreak is reported.    She is taking over-the-counter vitamin D supplements after recent lab work showed borderline levels. She was not taking vitamin D prior to the lab work.    She inquires about a small non-thrombosed hemorrhoid, which is not bothersome but is of cosmetic concern to her.    Wt Readings from Last 6 Encounters:   05/06/25 205 lb (93 kg)   03/11/25 200 lb (90.7 kg)   12/01/23 225 lb (102.1 kg)   04/17/23 210 lb (95.3 kg)   03/20/23 206 lb 3.2 oz (93.5 kg)   05/16/22 180 lb (81.6 kg)       Results        Current Medications[1]   Past Medical History[2]   Past Surgical History[3]   Family History[4]        Health Maintenance  Immunizations:   Immunization History   Administered Date(s) Administered    DTAP 06/07/1997, 08/09/1997, 12/01/1997, 09/28/1998    HEP B, Ped/Adol 05/10/1997, 07/11/1997, 02/14/1998    Hib, Unspecified Formulation 06/07/1997, 08/09/1997, 12/01/1997, 07/18/1998    Hpv Virus Vaccine 9 Marii Im 04/06/2021,  06/10/2021, 12/10/2021    IPV 06/07/1997, 08/09/1997, 09/28/1998    MMR 07/18/1998    TDAP 03/20/2023    Varicella 04/29/1998     Dental Visits: yes   Colon cancer screening:  No recommendations at this time   Gyne:     Health Maintenance   Topic Date Due    Pap Smear  03/10/2024            History of dysplasia:  No  Menstrual Cycle: 4/29          Breast Cancer Screening:  No recommendations at this time     Bone Density:  na     Osteoporosis Prevention:    Osteoporosis Prevention was discussed.  Reviewed Calcium, Vitamin D supplementation and Weight Bearing Exercises.    Patient is performing weight bearing exercises.  Patient is currently taking Vitamin D supplementation.        REVIEW OF SYSTEMS:   GENERAL: feels well otherwise  SKIN: denies any unusual skin lesions  EYES:denies blurred vision or double vision  HEENT: denies nasal congestion, sinus pain or ST  LUNGS: denies shortness of breath with exertion  CARDIOVASCULAR: denies chest pain on exertion  GI: denies abdominal pain,denies heartburn  : denies dysuria, vaginal discharge or itching  as above   MUSCULOSKELETAL: denies back pain  NEURO: denies headaches  PSYCH: no c/o      EXAM:   /76   Pulse 74   Temp 97.3 °F (36.3 °C) (Temporal)   Resp 14   Ht 5' 8\" (1.727 m)   Wt 205 lb (93 kg)   LMP 04/29/2025 (Approximate)   SpO2 99%   BMI 31.17 kg/m²   Body mass index is 31.17 kg/m².   GENERAL: well developed, well nourished,in no apparent distress  SKIN: no rashes,no suspicious lesions  HEENT: atraumatic, normocephalic,ears and throat are clear  EYES:PERRLA, EOMI, conjunctiva are clear  NECK: supple,no adenopathy,  CHEST: no chest tenderness  BREAST: no dominant or suspicious mass  LUNGS: clear to auscultation  CARDIO: RRR without murmur  GI: good BS's,no masses, HSM or tenderness  : Vagina normal and uterus normal. Normal cervix. Cervix exhibits no motion tenderness and no discharge. Bilateral adnexum display no mass, no tenderness and no  fullness.labia majora with small cystic lesions secondary to shaving/ingrown hair.  Able to expel white d/c    RECTAL: normal appearance with small nonthrombosed hemorrhoid noted.   MUSCULOSKELETAL: back is not tender,  EXTREMITIES: no edema  NEURO: Oriented times three,cranial nerves are intact,motor and sensory are grossly intact    ASSESSMENT AND PLAN:      HEALTH MAINTENANCE:  labs reviewed.      Assessment & Plan  Wellness Visit  Routine wellness visit with concerns about two external lesions on the labia majora, likely ingrown hairs from shaving. Vitamin D levels discussed.    Vitamin D deficiency  Borderline vitamin D levels. Currently taking over-the-counter vitamin D supplementation.  - Continue over-the-counter vitamin D supplementation.  - Consider doubling vitamin D dose during winter months.    Genital herpes  history of.  No recent outbreak  has valacyclovir to use if needed.  Needs refill  Managed with episodic valacyclovir treatment during outbreaks. - Provide refills for valacyclovir 500 mg oral bid for episodic treatment.    Non-thrombosed hemorrhoid  Small non-thrombosed hemorrhoid present. Inquired about removal not bothersome.  - Provide information on potential removal options.  - Refer to Dr. Rodriguez for follow-up if needed.      Encounter Diagnoses   Name Primary?    Routine general medical examination at a health care facility Yes    Screening for cervical cancer     Screen for STD (sexually transmitted disease)     Hemorrhoids, unspecified hemorrhoid type     Recurrent genital HSV (herpes simplex virus) infection     Vitamin D deficiency         Orders Placed This Encounter   Procedures    ThinPrep Pap with HPV Reflex, Chlamydia/GC    Chlamydia/Gc Amplification       Meds & Refills for this Visit:  Requested Prescriptions     Signed Prescriptions Disp Refills    valACYclovir 500 MG Oral Tab 14 tablet 2     Sig: Take 1 tablet (500 mg total) by mouth 2 (two) times daily.       Imaging &  Consults:  None    No follow-ups on file.  There are no Patient Instructions on file for this visit.             [1]   Current Outpatient Medications   Medication Sig Dispense Refill    valACYclovir 500 MG Oral Tab Take 1 tablet (500 mg total) by mouth 2 (two) times daily. 14 tablet 2   [2]   Past Medical History:   Genital herpes   [3]   Past Surgical History:  Procedure Laterality Date    Other surgical history  2020       [4]   Family History  Problem Relation Age of Onset    Cancer Mother

## 2025-05-07 LAB
C TRACH DNA SPEC QL NAA+PROBE: NEGATIVE
N GONORRHOEA DNA SPEC QL NAA+PROBE: NEGATIVE

## (undated) NOTE — MR AVS SNAPSHOT
After Visit Summary   3/10/2021    Laci Acharya    MRN: [id number]           Visit Information     Date & Time  3/10/2021 11:20 AM Provider  Shruthi Perez [facility], [address], [facility]  Phone  [facility] Physician or JAMES online. The physician will respond and provide   a treatment plan within a few hours.  ONLINE VISIT  Primary Care Providers  Treatment for mild illness or injury that does not require immediate attention VIDEO VISITS  Average cost  $35*